# Patient Record
Sex: FEMALE | Race: WHITE | NOT HISPANIC OR LATINO | Employment: PART TIME | URBAN - NONMETROPOLITAN AREA
[De-identification: names, ages, dates, MRNs, and addresses within clinical notes are randomized per-mention and may not be internally consistent; named-entity substitution may affect disease eponyms.]

---

## 2022-08-31 ENCOUNTER — OFFICE VISIT (OUTPATIENT)
Dept: OBSTETRICS AND GYNECOLOGY | Facility: CLINIC | Age: 67
End: 2022-08-31

## 2022-08-31 VITALS — DIASTOLIC BLOOD PRESSURE: 80 MMHG | SYSTOLIC BLOOD PRESSURE: 190 MMHG

## 2022-08-31 DIAGNOSIS — Z90.79 STATUS POST TAH-BSO: Primary | ICD-10-CM

## 2022-08-31 DIAGNOSIS — Z90.710 STATUS POST TAH-BSO: Primary | ICD-10-CM

## 2022-08-31 DIAGNOSIS — Z90.722 STATUS POST TAH-BSO: Primary | ICD-10-CM

## 2022-08-31 DIAGNOSIS — Z98.890 HISTORY OF BURCH URETHROPEXY: ICD-10-CM

## 2022-08-31 DIAGNOSIS — N81.10 FEMALE CYSTOCELE: ICD-10-CM

## 2022-08-31 PROCEDURE — 99203 OFFICE O/P NEW LOW 30 MIN: CPT | Performed by: OBSTETRICS & GYNECOLOGY

## 2022-08-31 RX ORDER — LANOLIN ALCOHOL/MO/W.PET/CERES
1000 CREAM (GRAM) TOPICAL DAILY
COMMUNITY

## 2022-08-31 RX ORDER — NIFEDIPINE 30 MG/1
30 TABLET, EXTENDED RELEASE ORAL DAILY
COMMUNITY

## 2022-08-31 RX ORDER — CHOLECALCIFEROL (VITAMIN D3) 1250 MCG
CAPSULE ORAL
COMMUNITY

## 2022-08-31 RX ORDER — ESZOPICLONE 3 MG/1
3 TABLET, FILM COATED ORAL NIGHTLY
COMMUNITY

## 2022-08-31 RX ORDER — HYDROCHLOROTHIAZIDE 12.5 MG/1
12.5 TABLET ORAL DAILY
COMMUNITY

## 2022-08-31 RX ORDER — LEVOTHYROXINE SODIUM 0.07 MG/1
75 TABLET ORAL DAILY
COMMUNITY

## 2022-08-31 RX ORDER — DIPHENHYDRAMINE HCL 25 MG
25 CAPSULE ORAL EVERY 6 HOURS PRN
COMMUNITY

## 2022-09-01 NOTE — PROGRESS NOTES
I called this patient to inform her of the appointment in [place].  It is scheduled for Tuesday, September 6 at 10:45 with Dr. Llamas.  This is at [address] [place] [address] [address] [place] [address].  Their number is [phone].  I informed the patient of this information.

## 2022-09-20 ENCOUNTER — PATIENT ROUNDING (BHMG ONLY) (OUTPATIENT)
Dept: OBSTETRICS AND GYNECOLOGY | Facility: CLINIC | Age: 67
End: 2022-09-20

## 2022-09-20 NOTE — PROGRESS NOTES
September 20, 2022    Hello, may I speak with Nati Nichols?    My name is Anny Crisostomo    I am  with [facility]  [facility]  [address]  [facility] [address] [phone].    Before we get started may I verify your date of birth? 1955    I am calling to officially welcome you to our practice and ask about your recent visit. Is this a good time to talk? Yes    Tell me about your visit with us. What things went well?  Patient was pleased with visit. She said staff was polite and the nurse made her feel calm due to her being very nervous.Patient stated  that Dr. Stern was very nice during her visit. Plus, she was very happy with wait time.        We're always looking for ways to make our patients' experiences even better. Do you have recommendations on ways we may improve? no    Overall were you satisfied with your first visit to our practice?Yes       I appreciate you taking the time to speak with me today. Is there anything else I can do for you? No      Thank you, and have a great day.

## 2022-09-26 ENCOUNTER — TRANSCRIBE ORDERS (OUTPATIENT)
Dept: PHYSICAL THERAPY | Facility: HOSPITAL | Age: 67
End: 2022-09-26

## 2022-09-26 DIAGNOSIS — R39.15 URINARY URGENCY: ICD-10-CM

## 2022-09-26 DIAGNOSIS — M79.18 MYALGIA OF PELVIC FLOOR: ICD-10-CM

## 2022-09-26 DIAGNOSIS — R35.0 URINARY FREQUENCY: ICD-10-CM

## 2022-09-26 DIAGNOSIS — N39.46 MIXED INCONTINENCE: ICD-10-CM

## 2022-09-26 DIAGNOSIS — R39.15 URGENCY OF URINATION: Primary | ICD-10-CM

## 2022-09-26 DIAGNOSIS — R10.2 PELVIC AND PERINEAL PAIN: ICD-10-CM

## 2022-10-05 ENCOUNTER — HOSPITAL ENCOUNTER (OUTPATIENT)
Dept: PHYSICAL THERAPY | Facility: HOSPITAL | Age: 67
Setting detail: THERAPIES SERIES
Discharge: HOME | End: 2022-10-05

## 2022-10-05 DIAGNOSIS — R10.2 PELVIC PAIN: Primary | ICD-10-CM

## 2022-10-05 PROCEDURE — 97162 PT EVAL MOD COMPLEX 30 MIN: CPT | Performed by: PHYSICAL THERAPIST

## 2022-10-05 NOTE — THERAPY EVALUATION
Outpatient Physical Therapy Pelvic Health Initial Evaluation  [facility]     Patient Name: Nati Nichols  : 1955  MRN: [id number]  Today's Date: 10/5/2022        Visit Date: 10/05/2022   Visit number:   Recheck: 11/3/22  Insurance: Medicare and [employer]    There is no problem list on file for this patient.       Past Medical History:   Diagnosis Date   • Diabetes mellitus (HCC)    • Disease of thyroid gland     hypothyroid   • Hypertension    • Insomnia         Past Surgical History:   Procedure Laterality Date   • APPENDECTOMY     • CARPAL TUNNEL RELEASE Left    • CHOLECYSTECTOMY     • EYE SURGERY      cataracts and lens implant   • HYSTERECTOMY       Allergies   Allergen Reactions   • Losartan Swelling           Visit Dx:    ICD-10-CM ICD-9-CM   1. Pelvic pain  R10.2 [id number]        Patient History     Row Name 10/02/22 1836             History    Chief Complaint Other 2 (comment) (P)    -patient      Patient/Caregiver Goals Relieve pain;Return to prior level of function (P)    -patient      Hand Dominance right-handed (P)    -patient      Occupation/sports/leisure activities Part time [employer] (P)    -patient      What clinical tests have you had for this problem? Urinalysis (P)    -patient      Are you or can you be pregnant No (P)    -patient              Fall Risk Assessment    Any falls in the past year: No (P)    -patient              Services    Prior Rehab/Home Health Experiences No (P)    -patient      Are you currently receiving Home Health services No (P)    -patient      Do you plan to receive Home Health services in the near future No (P)    -patient              Daily Activities    Primary Language English (P)    -patient      Are you able to read Yes (P)    -patient      Are you able to write Yes (P)    -patient      How does patient learn best? Listening;Reading;Demonstration;Pictures/Video (P)    -patient              Safety    Are you being hurt, hit, or  frightened by anyone at home or in your life? No (P)    -patient      Are you being neglected by a caregiver No (P)    -patient            User Key  (r) = Recorded By, (t) = Taken By, (c) = Cosigned By    Initials Name Provider Type    patient Nati Nichols --                 Pelvic Health     Row Name 10/05/22 1100             Subjective Comments    Subjective Comments Pt reports history of feeling the need to empty bladder all the day.  Onset of last 2-3 years. Urgency and some leakage noted.  Just doesn't make it in time.  I had noticed a bulge about 2 years prior.  Last 3 months, I started feeling of heaviness and achiness in lower pelvic girdle.  Also noted bulge comes down further.  Saw Leena and he referred her to Dr. Burton.  Anticipated surgical repair for vaginal lift.  Was pysched up with surgery possibility.  But he didn't skip to surgery quickly.  Patient fearful it will not cure the condition. Grade II.  Everyday struggles.  I feel it more with just sitting down.  Retired but works at [employer]. When on feet, I don't recognize it as much. Bladder voids average 8 per day and at least 2x/night.  Strong urge to urinate always.  Feels emptied following urination and has been waiting a few seconds longer to finish. Defecation about 1 per day or every other day.  [place] average of 4.  No stimulant or laxative required for bowels.  Hydration: coffee 1 cup, tea, water, and hawaiian punch.  Dietary intake is not always good.  Limited fruit and vegetables. No chronic pain issues noted.  -SW              Pregnancy Questions    Number of Pregnancies 2  -SW      Number of Miscarriages 0  -SW      Has the patient had an ? No  -SW      Number of Children 2  -SW      Type of Previous Deliveries Vaginal  -SW              Pain Assessment    Pain Assessment No/denies pain  -SW              Pelvic Floor Muscle    Patient/Parent/Guardian Consented to Internal Pelvic Floor Exam Yes  -SW       Strength (Right) 2: Squeeze no lift  -SW      Strength (Left) 2: Squeeze no lift  -SW      Symmetry of Sustained Maximal Contraction Symmetrical  -SW      Internal Pelvic Floor Comments mild tenderness noted along bilateral obt internus and levator ani muscle bellies.  Llittle to no contraction of PF noted.  Use of tactile stimulation did not create increased muscle activation.  POP with ant wall laxity noted at stage II.  Mild ant translation noted with valsalva.  Post wall with good stability.  -SW      External Pelvic Floor Comments normal inspection with external perineum.  No lesions or abnormalities noted.  No tenderness to external structures.  No visible clitoral nod with activation.  Apical breathing patterns noted with cues for diaphragmatic breathing.  -              Observations    Perineal Observation Performed? Yes  -SW      Posture Observations normal gait inspection.  No AD used.  Independent with all transfers and mobility.  Cognition intact.  -              Observation of Contraction in Perineum    Anal [place] --  not visible  -SW      Perineal Body Lift --  not visible with inspection  -SW              Pelvic Floor    Ability to Isolate Contraction of Pelvic Floor No  -SW      Overflow from Adjacent Muscles Upper Abdominals;Other (comment)  adductors  -SW              Cough    Bulge Yes  -SW              Prolapse (Pop-Q)    Cystocele Stage II  -SW      Rectocele Stage I  -              SEMG Evaluation    SEMG Evaluation Comments deferred this visit  -              Education Provided On:    Education Points HEP;Behavioral modifications  -      Method of Delivery Verbal;Demonstration;Written  -      Education Provided To Patient  -      Level of Understanding Teach back education performed;Verbalized;Demonstrated  -      HEP Comments diaphragmatic breathing with PF elevation over pillows.  -              Outcome Measures    Outcome Measure Options PFDI-20  score of 98.9  -SW               Pelvic Organ Prolapse Distress Inventory 6 (POPDI-6)    Do you usually experience pressure in the lower abdomen? 3  -SW      Do you usually experience heaviness or dullness in the pelvic area 3  -SW      Do you usually have a bulge or something falling out that you can see or feel in your vaginal area? 4  -SW      Do you ever have to push on the vagina or around the rectum to have or complete a bowel movement? 0  -SW      Do you usually experience a feeling of incomplete bladder emptying? 4  -SW      Do you ever have to push up on a bulge in the vaginal area with your fingers to start or complete urination? 0  -SW      POPDI-6 Score 2.33  -SW              Colorectal-Anal Distress Inventory 8 (CRAD-8)    Do you feel you need to strain too hard to have a bowel movement? 1  -SW      Do you feel you have not completely emptied your bowels at the end of a bowel movement? 0  -SW      Do you usually lose stool beyond your control if your stool is well formed? 0  -SW      Do you usually lose stool beyond your control if your stool is loose? 0  -SW      Do you usually lose gas from your rectum beyond your control? 0  -SW      Do you usually have pain when you pass your stool? 0  -SW      Do you experience a strong sense of urgency and have to rush to the bathroom to have a bowel movement 0  -SW      Does part of your bowel ever pass through the rectum and bulge outside during or after a bowel movement? 0  -SW      CRAD-8 Score 0.13  -SW              Urinary Distress Inventory 6 (HUEY-6)    Do you usually experience frequent urination? 4  -SW      Do you usually experience urine leakage associated with a feeling of urgency, that is, a strong sensation of needing to go to the bathroom? 0  -SW      Do you usually experience urine leakage related to coughing, sneezing, or laughing? 0  -SW      Do you usually experience small amounts of urine leakage(that is, drops)? 2  -SW      Do you usually experience difficulty emptying  your bladder? 0  -SW      Do you usually experience pain or discomfort in the lower abdomen or genital region? 3  -SW      HUEY-6 Score 1.5  -SW              General ROM    GENERAL ROM COMMENTS functional ROM to UE/LE  -SW              MMT (Manual Muscle Testing)    General MMT Comments functional LE/UE MMT for functional transfers and mobility.  -SW            User Key  (r) = Recorded By, (t) = Taken By, (c) = Cosigned By    Initials Name Provider Type    Diane Richmond, PT DPT Physical Therapist               PT Ortho     Row Name 10/05/22 1100       Subjective Pain    Able to rate subjective pain? yes  -SW    Pre-Treatment Pain Level 0  -SW    Post-Treatment Pain Level 0  -SW       Posture/Observations    Posture- WNL Posture is WNL  -SW    Posture/Observations Comments patient alert and oriented.  Good historian of medical information. NO apparent distress noted.  Gait is unremarkable.  -SW          User Key  (r) = Recorded By, (t) = Taken By, (c) = Cosigned By    Initials Name Provider Type    Diane Richmond, PT DPT Physical Therapist                            PT Assessment/Plan     Row Name 10/05/22 1700          PT Assessment    Functional Limitations Performance in leisure activities;Limitation in home management;Limitations in community activities;Performance in self-care ADL;Performance in work activities  -     Impairments Impaired muscle endurance;Impaired muscle power;Impaired postural alignment;Muscle strength;Pain;Poor body mechanics;Other (comment)  POP and UI  -SW     Assessment Comments Patient is a 68 yo female presenting with c/o pelvic weakness and secondary POP.  Her condition is complicated with bouts of SAVI and discomfort.  She would benefit from skiled therapy to address dysfunction and improve motor control for stabilization.  -SW     Rehab Potential Good  -SW     Patient/caregiver participated in establishment of treatment plan and goals Yes  -SW     Patient would benefit  from skilled therapy intervention Yes  -            PT Plan    PT Frequency 1x/week  -     Planned CPT's? PT EVAL MOD COMPLELITY: 24853;PT RE-EVAL: 83760;PT THER PROC EA 15 MIN: 42199;PT THER ACT EA 15 MIN: 45465;PT MANUAL THERAPY EA 15 MIN: 45252;PT NEUROMUSC RE-EDUCATION EA 15 MIN: 29406;PT SELF CARE/HOME MGMT/TRAIN EA 15: 05480;PT ELECTRICAL STIM ATTD EA 15 MIN: 58184  -     PT Plan Comments Diaphragmatic breathing with PF coordination.  SEMG as tool for activation or consideration of stimulation for muscle re-ed.  Postural training and pressure managment activities.  Dietary managment for bowels and bladder as to dec urgency and SAVI.  -           User Key  (r) = Recorded By, (t) = Taken By, (c) = Cosigned By    Initials Name Provider Type    Diane Richmond, PT DPT Physical Therapist                   OP Exercises     Row Name 10/05/22 1100             Subjective Comments    Subjective Comments Pt reports history of feeling the need to empty bladder all the day.  Onset of last 2-3 years. Urgency and some leakage noted.  Just doesn't make it in time.  I had noticed a bulge about 2 years prior.  Last 3 months, I started feeling of heaviness and achiness in lower pelvic girdle.  Also noted bulge comes down further.  Saw Leena and he referred her to Dr. Burton.  Anticipated surgical repair for vaginal lift.  Was pysched up with surgery possibility.  But he didn't skip to surgery quickly.  Patient fearful it will not cure the condition. Grade II.  Everyday struggles.  I feel it more with just sitting down.  Retired but works at [employer]. When on feet, I don't recognize it as much. Bladder voids average 8 per day and at least 2x/night.  Strong urge to urinate always.  Feels emptied following urination and has been waiting a few seconds longer to finish. Defecation about 1 per day or every other day.  [place] average of 4.  No stimulant or laxative required for bowels.  Hydration:  coffee 1 cup, tea, water, and hawaiian punch.  Dietary intake is not always good.  Limited fruit and vegetables. No chronic pain issues noted.  -              Subjective Pain    Able to rate subjective pain? yes  -SW      Pre-Treatment Pain Level 0  -SW      Post-Treatment Pain Level 0  -              Exercise 1    Exercise Name 1 diaphragmatic breathing  -SW      Additional Comments hand placement used for training, one over chest and other over diaphrgam.  -              Exercise 2    Exercise Name 2 buttocks elevated position  -      Time 2 10 min  -      Additional Comments position following work activities to assist with gravity position.  -              Exercise 3    Exercise Name 3 intro to dietary managmenet.  -      Additional Comments discussed need for dietary management to dec stool impaction or constipation as this inc pressure to perineum.  -            User Key  (r) = Recorded By, (t) = Taken By, (c) = Cosigned By    Initials Name Provider Type    Diane Richmond, PT DPT Physical Therapist                             PT OP Goals     Row Name 10/05/22 1700          PT Short Term Goals    STG Date to Achieve 11/03/22  -     STG 1 patient to perform diaphragmatic breathing x 3'min with cycles that are performed without cues.  -     STG 1 Progress New  -     STG 2 patient to comply with PF rest position x 10 minutes daily to assist with approximation of POP.  -     STG 2 Progress New  -     STG 3 patient to demonstrate positioning on toilet to assist with elimination and evacuation processess without strain or need to push.  -     STG 3 Progress New  -     STG 4 Patient to show activation of PF with presence of clitoral nod present upon visual examination.  -     STG 4 Progress New  -     STG 5 patient to report dietary management methods to improve intake that promotes good bowel consistency and less urgency to bladder.  -     STG 6 patient to show activation  of PF with SEMG in supine position x 10 reps with full return to baseline.  -     STG 6 Progress New  -     STG 7 Patient to begin bladder retraining methods at intervals of 1-2 hours without inc urgency reported between void schedule.  -     STG 7 Progress New  [facility]            Long Term Goals    LTG Date to Achieve 03/30/23  -     LTG 1 void schedule of 3-4 hours, on average of 6-8 voids per day.  -     LTG 1 Progress New  -     LTG 2 patient to report dec pressure to PF such that she is able to remain in standing position as with grocery shopping 1 hour prior to inc pressure is noted by vaginal cuff.  -     LTG 2 Progress New  -     LTG 3 Nocturia at 0-1 time per night with good control of urge suppression.  -     LTG 3 Progress New  -     LTG 4 patient to show endurance contractions of PF with SEMG position of seated or standing x 10 sec holds that will assist with POP support.  -     LTG 4 Progress New  -     LTG 5 Pt to report PFDI SF 20 score of 30 or less indicating improved function with daily activities with less restrictions.  -     LTG 5 Progress New  -            Time Calculation    PT Goal Re-Cert Due Date 11/03/22  -           User Key  (r) = Recorded By, (t) = Taken By, (c) = Cosigned By    Initials Name Provider Type    Diane Richmond, PT DPT Physical Therapist                Therapy Education  Given: HEP, Symptoms/condition management  Program: New  How Provided: Verbal, Demonstration, Written  Provided to: Patient  Level of Understanding: Teach back education performed, Verbalized, Demonstrated               Time Calculation:   Start Time: 1125  Stop Time: 1245  Time Calculation (min): 80 min  Therapy Charges for Today     Code Description Service Date Service Provider Modifiers Qty    37757991316 HC-PT EVAL MOD COMPLEXITY 5 10/5/2022 Diane Vanessa, PT DPT  1    06165626577 HC PT THER SUPP EA 15 MIN 10/5/2022 Diane Vanessa, PT DPT GP 1                   Diane Vanessa, PT DPT  10/5/2022

## 2022-10-19 ENCOUNTER — HOSPITAL ENCOUNTER (OUTPATIENT)
Dept: PHYSICAL THERAPY | Facility: HOSPITAL | Age: 67
Setting detail: THERAPIES SERIES
Discharge: HOME | End: 2022-10-19

## 2022-10-19 DIAGNOSIS — R10.2 PELVIC PAIN: Primary | ICD-10-CM

## 2022-10-19 PROCEDURE — 97140 MANUAL THERAPY 1/> REGIONS: CPT

## 2022-10-19 PROCEDURE — 97535 SELF CARE MNGMENT TRAINING: CPT

## 2022-10-19 NOTE — THERAPY TREATMENT NOTE
Outpatient Physical Therapy Pelvic Health Treatment Note  [facility]     Patient Name: Nati Nichols  : 1955  MRN: [id number]  Today's Date: 10/19/2022    Visit number:   Recheck: 11/3/22  Insurance: Medicare and [employer] [employer]    Visit Date: 10/19/2022       Visit Dx:    ICD-10-CM ICD-9-CM   1. Pelvic pain  R10.2 [id number]       There is no problem list on file for this patient.        Pelvic Health     Row Name 10/19/22 09             Subjective Comments    Subjective Comments Pt reports everything is about the same. States she has  been doing the breathing exercises. States she does them at night. Reports she doubles one pillow up instead of two. Pt reports urges are about the same. Trying hard to go every two hours.  -AC         Pregnancy Questions    Number of Pregnancies 2  -AC      Number of Miscarriages 0  -AC      Has the patient had an ? No  -AC      Number of Children 2  -AC      Type of Previous Deliveries Vaginal  -AC         Pain Assessment    Pain Assessment No/denies pain  -AC            User Key  (r) = Recorded By, (t) = Taken By, (c) = Cosigned By    Initials Name Provider Type     Ivett Barrios, PT Physical Therapist               PT Ortho     Row Name 10/19/22 09       Subjective Pain    Able to rate subjective pain? yes  -AC    Pre-Treatment Pain Level 0  -AC    Post-Treatment Pain Level 0  -AC          User Key  (r) = Recorded By, (t) = Taken By, (c) = Cosigned By    Initials Name Provider Type     Ivett Barrios PT Physical Therapist                            PT Assessment/Plan     Row Name 10/19/22 09          PT Assessment    Assessment Comments PT and patient spent majority of treatment session reinforcing diaphragmatic breathing, relaxation techniques, and abdominal massage education. Pt tolerated abdominal massage well with no increase in symptoms. Pt demo slight adhesions on L abdominal wall near descending colon. PT ed patient to spend more time on  this area during abdominal massage. PT provided patient with a bladder diary at end of session to complete over the next 2 weeks. Will review with patient at next appointment.  -AC     Rehab Potential Good  -AC     Patient/caregiver participated in establishment of treatment plan and goals Yes  -AC     Patient would benefit from skilled therapy intervention Yes  -AC        PT Plan    PT Frequency 1x/week  -AC     PT Plan Comments Next visit review bladder diary and provide education on  bladder health, bladder irritants, and bladder urgency suppression. Initiate bladder retraining. Assess PF contractions next visit.  -AC           User Key  (r) = Recorded By, (t) = Taken By, (c) = Cosigned By    Initials Name Provider Type    AC Ivett Barrios, PT Physical Therapist                   OP Exercises     Row Name 10/19/22 0900             Subjective Comments    Subjective Comments Pt reports everything is about the same. States she has  been doing the breathing exercises. States she does them at night. Reports she doubles one pillow up instead of two. Pt reports urges are about the same. Trying hard to go every two hours.  -AC         Subjective Pain    Able to rate subjective pain? yes  -AC      Pre-Treatment Pain Level 0  -AC      Post-Treatment Pain Level 0  -AC         Exercise 1    Exercise Name 1 Patient education on relaxation techniques, diaphragmatic breathing  -AC      Time 1 20 mins  -AC         Exercise 2    Exercise Name 2 Diaphragmatic breathing with pelvic floor coordination  -AC      Time 2 10 min  -AC      Additional Comments buttocks elevated position  -AC         Exercise 3    Exercise Name 3 Diaphragmatic breathing with PF contraction  -AC      Time 3 5 mins  -AC         Exercise 4    Exercise Name 4 Abdominal massage + patient education  -AC      Time 4 8 mins  -AC         Exercise 5    Exercise Name 5 Bladder diary education for HEP  -AC      Time 5 12 mins  -AC            User Key  (r) =  Recorded By, (t) = Taken By, (c) = Cosigned By    Initials Name Provider Type    Ivett Barney, PT Physical Therapist                             PT OP Goals     Row Name 10/19/22 0900          PT Short Term Goals    STG Date to Achieve 11/03/22  -     STG 1 patient to perform diaphragmatic breathing x 3'min with cycles that are performed without cues.  -AC     STG 1 Progress New  -     STG 2 patient to comply with PF rest position x 10 minutes daily to assist with approximation of POP.  -AC     STG 2 Progress New  -     STG 3 patient to demonstrate positioning on toilet to assist with elimination and evacuation processess without strain or need to push.  -AC     STG 3 Progress New  -     STG 4 Patient to show activation of PF with presence of clitoral nod present upon visual examination.  -AC     STG 4 Progress New  -     STG 5 patient to report dietary management methods to improve intake that promotes good bowel consistency and less urgency to bladder.  -     STG 6 patient to show activation of PF with SEMG in supine position x 10 reps with full return to baseline.  -AC     STG 6 Progress New  -     STG 7 Patient to begin bladder retraining methods at intervals of 1-2 hours without inc urgency reported between void schedule.  -     STG 7 Progress New  -        Long Term Goals    LTG Date to Achieve 03/30/23  -     LTG 1 void schedule of 3-4 hours, on average of 6-8 voids per day.  -AC     LTG 1 Progress New  -     LTG 2 patient to report dec pressure to PF such that she is able to remain in standing position as with grocery shopping 1 hour prior to inc pressure is noted by vaginal cuff.  -AC     LTG 2 Progress New  -     LTG 3 Nocturia at 0-1 time per night with good control of urge suppression.  -AC     LTG 3 Progress New  -     LTG 4 patient to show endurance contractions of PF with SEMG position of seated or standing x 10 sec holds that will assist with POP support.  -     LTG  4 Progress New  -AC     LTG 5 Pt to report PFDI SF 20 score of 30 or less indicating improved function with daily activities with less restrictions.  -AC     LTG 5 Progress New  -AC        Time Calculation    PT Goal Re-Cert Due Date 11/03/22  -AC           User Key  (r) = Recorded By, (t) = Taken By, (c) = Cosigned By    Initials Name Provider Type    AC BarriosIvett, PT Physical Therapist                                Time Calculation:   Start Time: 0913  Stop Time: 1014  Time Calculation (min): 61 min  Therapy Charges for Today     Code Description Service Date Service Provider Modifiers Qty    99074711427 HC PT THER SUPP EA 15 MIN 10/19/2022 [place], PT GP 2    40333962998 HC PT MANUAL THERAPY EA 15 MIN 10/19/2022 [place] Ivett, PT GP 1    [id number] HC PT SELF CARE/MGMT/TRAIN EA 15 MIN 10/19/2022 [place], PT GP 3                    Ivett [place], KAMRYN , DPT 10/19/2022

## 2022-11-02 ENCOUNTER — HOSPITAL ENCOUNTER (OUTPATIENT)
Dept: PHYSICAL THERAPY | Facility: HOSPITAL | Age: 67
Setting detail: THERAPIES SERIES
Discharge: HOME | End: 2022-11-02

## 2022-11-02 DIAGNOSIS — R10.2 PELVIC PAIN: Primary | ICD-10-CM

## 2022-11-02 PROCEDURE — 97535 SELF CARE MNGMENT TRAINING: CPT

## 2022-11-02 NOTE — THERAPY TREATMENT NOTE
Outpatient Physical Therapy Pelvic Health Treatment Note  [facility]     Patient Name: Nati Nichols  : 1955  MRN: [id number]  Today's Date: 2022        Visit Date: 2022   Visit number: 3/3  Recheck: 11/3/22  Insurance: Medicare and [employer]    Visit Dx:    ICD-10-CM ICD-9-CM   1. Pelvic pain  R10.2 [id number]       There is no problem list on file for this patient.        Pelvic Health     Row Name 22 0900             Subjective Comments    Subjective Comments Pt reports she has had a cough for the past couple of days. Pt reports she has been good. Pt reports she stopped drinking hawaiian punch. Reports no leakage recently. Reports she voids about 6x/day.  -AC         Pregnancy Questions    Number of Pregnancies 2  -AC      Number of Miscarriages 0  -AC      Has the patient had an ? No  -AC      Number of Children 2  -AC      Type of Previous Deliveries Vaginal  -AC         Pain Assessment    Pain Assessment No/denies pain  -AC            User Key  (r) = Recorded By, (t) = Taken By, (c) = Cosigned By    Initials Name Provider Type    AC Ivett Barrios, PT Physical Therapist                              PT Assessment/Plan     Row Name 22 1000          PT Assessment    Assessment Comments PT and patient spent entire session review bladder diary. PT also provided patient with bladder retraining skills and review of bladder health, bladder irritants, and fiber intake. Pt bladder diary revealed increased voids every 1x/hour to 2x/hour each day. Pt also exhibits ~20-30 oz of fluid per day with 2/3 bladder irritants and 1/3 nonirritants. PT ed pt to increased fluid oz to 40 oz per day with 2/3 or 26 oz nonirritants or water and 1/3 bladder irritants. Pt verbalized understand. Bladder diary also revealed poor diet habits with little to no fiber content and no fruits or vegatables. PT ed pt on fiber intake and eating 3 square meals per day. Stools stayed at type 6 on bristol  stool scale indicating looser stools and not enough fiber intake. For bladder retraining pt will attempt to void every 2-2.5 hours per day at regular scheduled intervals. Will reassess progress in 2 weeks.  -     Rehab Potential Good  -     Patient/caregiver participated in establishment of treatment plan and goals Yes  -AC     Patient would benefit from skilled therapy intervention Yes  -AC        PT Plan    PT Frequency Other (comment)  every 2 weeks  -     PT Plan Comments Next visit reassess bladder retraining. Add biofeedback and pelvic floor contraction review.  -           User Key  (r) = Recorded By, (t) = Taken By, (c) = Cosigned By    Initials Name Provider Type    Ivett Barney, PT Physical Therapist                   OP Exercises     Row Name 11/02/22 0900             Subjective Comments    Subjective Comments Pt reports she has had a cough for the past couple of days. Pt reports she has been good. Pt reports she stopped drinking hawaiian punch. Reports no leakage recently. Reports she voids about 6x/day.  -         Exercise 1    Exercise Name 1 Bladder diary review  -         Exercise 2    Exercise Name 2 Review of HEP and diaphragmatic breathing  -         Exercise 3    Exercise Name 3 Bladder health, bladder irritants, fiber intake education  -         Exercise 4    Exercise Name 4 Bladder Retraining  -            User Key  (r) = Recorded By, (t) = Taken By, (c) = Cosigned By    Initials Name Provider Type    Ivett Barney, PT Physical Therapist                             PT OP Goals     Row Name 11/02/22 1000 11/02/22 0900       PT Short Term Goals    STG Date to Achieve 11/03/22  -AC --    STG 1 patient to perform diaphragmatic breathing x 3'min with cycles that are performed without cues.  -AC --    STG 1 Progress Met  -AC --    STG 2 patient to comply with PF rest position x 10 minutes daily to assist with approximation of POP.  -AC --    STG 2 Progress Met  -AC --     STG 3 patient to demonstrate positioning on toilet to assist with elimination and evacuation processess without strain or need to push.  -AC --    STG 3 Progress Progressing  -AC --    STG 4 Patient to show activation of PF with presence of clitoral nod present upon visual examination.  -AC --    STG 4 Progress New  -AC --    STG 5 patient to report dietary management methods to improve intake that promotes good bowel consistency and less urgency to bladder.  -AC --    STG 5 Progress Progressing  -AC --    STG 6 patient to show activation of PF with SEMG in supine position x 10 reps with full return to baseline.  -AC --    STG 6 Progress New  -AC --    STG 7 Patient to begin bladder retraining methods at intervals of 1-2 hours without inc urgency reported between void schedule.  -AC --    STG 7 Progress Met;Ongoing  -AC --       Long Term Goals    LTG Date to Achieve 03/30/23  -AC --    LTG 1 void schedule of 3-4 hours, on average of 6-8 voids per day.  -AC --    LTG 1 Progress New  -AC --    LTG 2 patient to report dec pressure to PF such that she is able to remain in standing position as with grocery shopping 1 hour prior to inc pressure is noted by vaginal cuff.  -AC --    LTG 2 Progress New  -AC --    LTG 3 Nocturia at 0-1 time per night with good control of urge suppression.  -AC --    LTG 3 Progress New  -AC --    LTG 4 patient to show endurance contractions of PF with SEMG position of seated or standing x 10 sec holds that will assist with POP support.  -AC --    LTG 4 Progress New  -AC --    LTG 5 Pt to report PFDI SF 20 score of 30 or less indicating improved function with daily activities with less restrictions.  -AC --    LTG 5 Progress New  -AC --       Time Calculation    PT Goal Re-Cert Due Date -- 11/03/22  -AC          User Key  (r) = Recorded By, (t) = Taken By, (c) = Cosigned By    Initials Name Provider Type    Ivett Barney, PT Physical Therapist                                Time  Calculation:   Start Time: 0915  Stop Time: 1016  Time Calculation (min): 61 min  Therapy Charges for Today     Code Description Service Date Service Provider Modifiers Qty    07530002235 HC PT THER SUPP EA 15 MIN 11/2/2022 Ivett Barrios, PT GP 1    [id number]  PT SELF CARE/MGMT/TRAIN EA 15 MIN 11/2/2022 Ivett Barrios, PT GP 4                    Autumn KAMRYN Barrios , DPT 11/2/2022

## 2022-11-16 ENCOUNTER — HOSPITAL ENCOUNTER (OUTPATIENT)
Dept: PHYSICAL THERAPY | Facility: HOSPITAL | Age: 67
Setting detail: THERAPIES SERIES
Discharge: HOME | End: 2022-11-16

## 2022-11-16 DIAGNOSIS — R10.2 PELVIC PAIN: Primary | ICD-10-CM

## 2022-11-16 PROCEDURE — 97535 SELF CARE MNGMENT TRAINING: CPT

## 2022-11-16 PROCEDURE — 97112 NEUROMUSCULAR REEDUCATION: CPT

## 2022-11-16 PROCEDURE — 97140 MANUAL THERAPY 1/> REGIONS: CPT

## 2022-11-16 NOTE — THERAPY TREATMENT NOTE
Outpatient Physical Therapy Pelvic Health Progress Note  [facility]     Patient Name: Nati Nichols  : 1955  MRN: [id number]  Today's Date: 2022    Visit number:   Recheck: 2022   Insurance: Medicare and [place] [employer]    Visit Date: 2022    Visit Dx:    ICD-10-CM ICD-9-CM   1. Pelvic pain  R10.2 [id number]       There is no problem list on file for this patient.        Pelvic Health     Row Name 22 0900             Pregnancy Questions    Number of Pregnancies 2  -AC      Number of Miscarriages 0  -AC      Has the patient had an ? No  -AC      Number of Children 2  -AC      Type of Previous Deliveries Vaginal  -AC         Pain Assessment    Pain Assessment No/denies pain  -AC            User Key  (r) = Recorded By, (t) = Taken By, (c) = Cosigned By    Initials Name Provider Type    AC Ivett Barrios, PT Physical Therapist               PT Ortho     Row Name 22 09       Subjective Comments    Subjective Comments Pt reports she still has a cough. States things have been going good. Pt reports she is been trying to incorporate more water and has added gingerale. Patient also states she completely stopped drinking [place] punch. Pt reports frequency is about the same. Pt reports she is going every 2.5 hours. Pt reports sometimes it can be 1.5 hours. Pt reports bristol stool scale is averaging type 4 or 5. Pt reports she has been eating some apple sauce and yogurt for bulking stool. Pt reports she is not feeling pressure nearly as much. Pt reports she only feels pressure when she really has to go to the BR. Reports pain in L abdominal region and L anterior groin region due to a recent LOB at work where she almost fell but caught herself.  -AC       Subjective Pain    Able to rate subjective pain? yes  -AC    Pre-Treatment Pain Level 0  -AC    Post-Treatment Pain Level 0  -AC          User Key  (r) = Recorded By, (t) = Taken By, (c) = Cosigned By    Initials Name  Provider Type    AC Ivett Barrios, PT Physical Therapist                            PT Assessment/Plan     Row Name 11/16/22 0900          PT Assessment    Functional Limitations Performance in leisure activities;Limitation in home management;Limitations in community activities;Performance in self-care ADL;Performance in work activities  -     Impairments Impaired muscle endurance;Impaired muscle power;Impaired postural alignment;Muscle strength;Pain;Poor body mechanics;Other (comment)  POP and UI  -AC     Assessment Comments Patient good recall of bladder health and toileting positions from previous visit. Pt reports she is maintaining 2.5 hour intervals for voids most of the time. States she has increased frequency for voids at home due to not being able to distract herself well. Patient presents with some tissue restrictions to L abdominal lower quadrant region and L hip iliopsoas this date. STM/MFR performed in those regions with symptom reduction reported at end of session. PT and patient spent increased time this date going over PF contractions and coordination with diaphragmatic breathing. Patient asked to demo with incorrect form and technique with patient attempting to contract on inhale rather than exhale. Pt responded well with zipper cue for proper PF contraction. PT provided PF contractions in buttocks elevated position to HEP. Will perform SEMG next visit now that patient has better understanding of technique. Patient at this time is making good progress with 5/13 goals met. Pt would still continue from skilled PT to address remaining dysfunction, improve motor control, and reduce pressure present from POP.  -AC     Rehab Potential Good  -AC     Patient/caregiver participated in establishment of treatment plan and goals Yes  -AC     Patient would benefit from skilled therapy intervention Yes  -AC        PT Plan    PT Frequency 1x/week  -AC     PT Plan Comments Next visit perform SEMG for pelvic  "floor retraining. Continue MT to L iliopsoas as needed.  -AC           User Key  (r) = Recorded By, (t) = Taken By, (c) = Cosigned By    Initials Name Provider Type    AC Ivett Barrios, PT Physical Therapist                   OP Exercises     Row Name 11/16/22 0900             Subjective Comments    Subjective Comments Pt reports she still has a cough. States things have been going good. Pt reports she is been trying to incorporate more water and has added gingerale. Patient also states she completely stopped drinking [place]. Pt reports frequency is about the same. Pt reports she is going every 2.5 hours. Pt reports sometimes it can be 1.5 hours. Pt reports bristol stool scale is averaging type 4 or 5. Pt reports she has been eating some apple sauce and yogurt for bulking stool. Pt reports she is not feeling pressure nearly as much. Pt reports she only feels pressure when she really has to go to the BR. Reports pain in L abdominal region and L anterior groin region due to a recent LOB at work where she almost fell but caught herself.  -AC         Subjective Pain    Able to rate subjective pain? yes  -AC      Pre-Treatment Pain Level 0  -AC      Post-Treatment Pain Level 0  -AC         Exercise 1    Exercise Name 1 Review of bladder retraining and urge suppression techniques  -AC      Time 1 5 mins  -AC         Exercise 2    Exercise Name 2 Review of diaphragmatic breathing  -AC      Time 2 5 mins  -AC         Exercise 3    Exercise Name 3 TA contraction in buttocks elevated position  -AC      Cueing 3 Verbal;Tactile;Demo  -AC      Time 3 3\" hold for 5 mins  -AC         Exercise 4    Exercise Name 4 PF contractions in buttocks elevated position  -AC      Cueing 4 Verbal;Tactile  -AC      Time 4 8 mins  -AC      Additional Comments coordination with diaphragmatic breathing  -AC         Exercise 5    Exercise Name 5 L hip flexor st. stretch  -AC      Sets 5 1  -AC      Time 5 30 sec hold  -AC         " Exercise 6    Exercise Name 6 manual therapy  -AC      Additional Comments see flowchart  -            User Key  (r) = Recorded By, (t) = Taken By, (c) = Cosigned By    Initials Name Provider Type    Ivett Barney, PT Physical Therapist              Manual Rx (last 36 hours)     Manual Treatments     Row Name 11/16/22 0900             Manual Rx 1    Manual Rx 1 Location STM/MFR with reinforcement L iliopsoas  -AC      Manual Rx 1 Duration 10 mins  -AC         Manual Rx 2    Manual Rx 2 Location STM to L abdomen lower quadrant  -AC      Manual Rx 2 Duration 8 mins  -            User Key  (r) = Recorded By, (t) = Taken By, (c) = Cosigned By    Initials Name Provider Type    AC Ivett Barrios, PT Physical Therapist                           PT OP Goals     Row Name 11/16/22 0900          PT Short Term Goals    STG Date to Achieve 12/07/22  -     STG 1 patient to perform diaphragmatic breathing x 3'min with cycles that are performed without cues.  -AC     STG 1 Progress Met  -AC     STG 2 patient to comply with PF rest position x 10 minutes daily to assist with approximation of POP.  -AC     STG 2 Progress Met  -AC     STG 3 patient to demonstrate positioning on toilet to assist with elimination and evacuation processess without strain or need to push.  -AC     STG 3 Progress Met  -     STG 4 Patient to show activation of PF with presence of clitoral nod present upon visual examination.  -AC     STG 4 Progress Ongoing;Progressing  -AC     STG 5 patient to report dietary management methods to improve intake that promotes good bowel consistency and less urgency to bladder.  -     STG 5 Progress Met  -     STG 6 patient to show activation of PF with SEMG in supine position x 10 reps with full return to baseline.  -     STG 6 Progress New  -     STG 7 Patient to begin bladder retraining methods at intervals of 1-2 hours without inc urgency reported between void schedule.  -AC     STG 7 Progress  Met;Ongoing  -AC        Long Term Goals    LTG Date to Achieve 03/30/23  -AC     LTG 1 void schedule of 3-4 hours, on average of 6-8 voids per day.  -AC     LTG 1 Progress New  -AC     LTG 2 patient to report dec pressure to PF such that she is able to remain in standing position as with grocery shopping 1 hour prior to inc pressure is noted by vaginal cuff.  -AC     LTG 2 Progress New  -AC     LTG 3 Nocturia at 0-1 time per night with good control of urge suppression.  -AC     LTG 3 Progress New  -AC     LTG 4 patient to show endurance contractions of PF with SEMG position of seated or standing x 10 sec holds that will assist with POP support.  -AC     LTG 4 Progress New  -AC     LTG 5 Pt to report PFDI SF 20 score of 30 or less indicating improved function with daily activities with less restrictions.  -     LTG 5 Progress New  -        Time Calculation    PT Goal Re-Cert Due Date 12/07/22  -           User Key  (r) = Recorded By, (t) = Taken By, (c) = Cosigned By    Initials Name Provider Type    Ivett Barney, PT Physical Therapist                                Time Calculation:   Start Time: 0905  Stop Time: 1010  Time Calculation (min): 65 min  Therapy Charges for Today     Code Description Service Date Service Provider Modifiers Qty    08535917673 HC PT THER SUPP EA 15 MIN 11/16/2022 BarriosIvett, PT GP 1    [id number] HC PT MANUAL THERAPY EA 15 MIN 11/16/2022 BarriosIvett, PT GP 2    [id number] HC PT NEUROMUSC RE EDUCATION EA 15 MIN 11/16/2022 BarriosIvett, PT GP 1    [id number] HC PT SELF CARE/MGMT/TRAIN EA 15 MIN 11/16/2022 BarriosIvett, PT GP 1                    Ivett Barrios PT , DPT 11/16/2022

## 2022-11-23 ENCOUNTER — HOSPITAL ENCOUNTER (OUTPATIENT)
Dept: PHYSICAL THERAPY | Facility: HOSPITAL | Age: 67
Setting detail: THERAPIES SERIES
Discharge: HOME | End: 2022-11-23

## 2022-11-23 DIAGNOSIS — R10.2 PELVIC PAIN: Primary | ICD-10-CM

## 2022-11-23 PROCEDURE — 97112 NEUROMUSCULAR REEDUCATION: CPT

## 2022-11-23 PROCEDURE — 97140 MANUAL THERAPY 1/> REGIONS: CPT

## 2022-11-23 NOTE — THERAPY TREATMENT NOTE
Outpatient Physical Therapy Pelvic Health Treatment Note  [facility]     Patient Name: Nati Nichols  : 1955  MRN: [id number]  Today's Date: 2022    Visit number:   Recheck: 2022   Insurance: Medicare and [place] [employer]    Visit Date: 2022    Visit Dx:    ICD-10-CM ICD-9-CM   1. Pelvic pain  R10.2 [id number]       There is no problem list on file for this patient.        Pelvic Health     Row Name 22 08             Subjective Comments    Subjective Comments Patient reports things are about the same. Reports she started buying whole grains and eating broccoli and grapes. Started eating more pickles. Pt reports she has been going roughly 2-2.5 hour voids. Pt reports she has been doing her urge suppression tecniques. Reports she is unsure if it helps or takes her mind off of it. Pt denies leakage. Pt reports last night she had 3 urgency for void and had to run to the bathroom. States she thinks the exercises seemed to be helping. Reports she had one night where she didnt go to the bathroom at night.  -AC         Pregnancy Questions    Number of Pregnancies 2  -AC      Number of Miscarriages 0  -AC      Has the patient had an ? No  -AC      Number of Children 2  -AC      Type of Previous Deliveries Vaginal  -AC         Pain Assessment    Pain Assessment No/denies pain  -AC            User Key  (r) = Recorded By, (t) = Taken By, (c) = Cosigned By    Initials Name Provider Type    AC Ivett Barrios, PT Physical Therapist               PT Ortho     Row Name 22 08       Subjective Pain    Able to rate subjective pain? yes  -AC          User Key  (r) = Recorded By, (t) = Taken By, (c) = Cosigned By    Initials Name Provider Type    AC Ivett Barrios, PT Physical Therapist                            PT Assessment/Plan     Row Name 22 08          PT Assessment    Assessment Comments Patient repots some improvement since initial eval. Patient reports reduced  urgency and pressure to PF. Patient demo good carryover from last session with proper TA activation with coordination of diaphragmatic breathing. Incorporated new core stability + PF muscle activation exercises this date. Patient required increased cueing for BKFO due to losing TA contraction during eccentric lowering phase of B LEs. Will need continued reinforcement. Taut bands and tenderness still present to L iliopsoas. Patient presented with tenderness to L rectus abdominis this date with reduction of symptoms after MFR to tissue fibers. SEMG will be performed next visit to begin muscle re-ed of PF muscles. Increased voiding schedule to 2.5 hour increments this date. Patient verbalized understanding. Added hip adduction with core +PF contraction to HEP.  -AC     Rehab Potential Good  -AC     Patient/caregiver participated in establishment of treatment plan and goals Yes  -AC     Patient would benefit from skilled therapy intervention Yes  -AC        PT Plan    PT Frequency 1x/week  -AC     PT Plan Comments SEMG next visit. Continue core stabilization and pelvic floor activation exercises. MT to L rectus abdominis fibers and L iliopsoas as tolerated. Reassess bladder retraining.  -AC           User Key  (r) = Recorded By, (t) = Taken By, (c) = Cosigned By    Initials Name Provider Type     Ivett Barrios, PT Physical Therapist                   OP Exercises     Row Name 11/23/22 0800             Subjective Comments    Subjective Comments Patient reports things are about the same. Reports she started buying whole grains and eating broccoli and grapes. Started eating more pickles. Pt reports she has been going roughly 2-2.5 hour voids. Pt reports she has been doing her urge suppression tecniques. Reports she is unsure if it helps or takes her mind off of it. Pt denies leakage. Pt reports last night she had 3 urgency for void and had to run to the bathroom. States she thinks the exercises seemed to be helping.  "Reports she had one night where she didnt go to the bathroom at night.  -AC         Subjective Pain    Able to rate subjective pain? yes  -AC      Pre-Treatment Pain Level 0  -AC      Post-Treatment Pain Level 0  -AC         Exercise 1    Exercise Name 1 review of bladder retraining  -AC      Time 1 5 mins  -AC      Additional Comments Still maintaining 2-2.5 hour increments.  -AC         Exercise 2    Exercise Name 2 Manual therapy  -AC      Additional Comments see flow chart  -AC         Exercise 3    Exercise Name 3 TA contraction in buttocks elevated position  -AC      Cueing 3 Verbal;Tactile;Demo  -AC      Time 3 3\" hold for 5 mins  -AC         Exercise 4    Exercise Name 4 PF contractions in buttocks elevated position  -AC      Cueing 4 Verbal;Tactile  -AC      Time 4 3 mins  -AC         Exercise 5    Exercise Name 5 hip adduction with core + PF in elevated buttock position  -AC      Cueing 5 Verbal;Tactile;Demo  -AC      Sets 5 1  -AC      Reps 5 20  -AC      Time 5 3\" hold  -AC      Additional Comments pillow in between legs  -AC         Exercise 6    Exercise Name 6 BKFO with core + PF in elevated buttock position  -AC      Cueing 6 Verbal;Tactile;Demo  -AC      Sets 6 1  -AC      Reps 6 10 each  -AC      Additional Comments Patient required cues to maintain TA contraction during eccentric lowering phase of activity.  -AC         Exercise 7    Exercise Name 7 L hip flexor st. stretch  -AC      Sets 7 2  -AC      Time 7 30 sec hold  -AC         Exercise 8    Exercise Name 8 SEMG  -AC      Additional Comments deferred this date. Will perform next visit  -AC         Exercise 9    Exercise Name 9 Patient ed on POC and updated HEP  -AC            User Key  (r) = Recorded By, (t) = Taken By, (c) = Cosigned By    Initials Name Provider Type    Ivett Barney PT Physical Therapist              Manual Rx (last 36 hours)     Manual Treatments     Row Name 11/23/22 0900             Manual Rx 1    Manual Rx 1 " Location STM/MFR with deactivation L rectus abdominis  -AC         Manual Rx 2    Manual Rx 2 Location STM/MFR with deactivation L iliopsoas  -AC            User Key  (r) = Recorded By, (t) = Taken By, (c) = Cosigned By    Initials Name Provider Type    Ivett Barney, PT Physical Therapist                           PT OP Goals     Row Name 11/23/22 0800          PT Short Term Goals    STG Date to Achieve 12/07/22  -AC     STG 1 patient to perform diaphragmatic breathing x 3'min with cycles that are performed without cues.  -AC     STG 1 Progress Met  -AC     STG 2 patient to comply with PF rest position x 10 minutes daily to assist with approximation of POP.  -AC     STG 2 Progress Met  -AC     STG 3 patient to demonstrate positioning on toilet to assist with elimination and evacuation processess without strain or need to push.  -AC     STG 3 Progress Met  -AC     STG 4 Patient to show activation of PF with presence of clitoral nod present upon visual examination.  -AC     STG 4 Progress Ongoing;Progressing  -AC     STG 5 patient to report dietary management methods to improve intake that promotes good bowel consistency and less urgency to bladder.  -AC     STG 5 Progress Met  -AC     STG 6 patient to show activation of PF with SEMG in supine position x 10 reps with full return to baseline.  -AC     STG 6 Progress New  -AC     STG 7 Patient to begin bladder retraining methods at intervals of 1-2 hours without inc urgency reported between void schedule.  -AC     STG 7 Progress Met;Ongoing  -AC        Long Term Goals    LTG Date to Achieve 03/30/23  -AC     LTG 1 void schedule of 3-4 hours, on average of 6-8 voids per day.  -AC     LTG 1 Progress New  -AC     LTG 2 patient to report dec pressure to PF such that she is able to remain in standing position as with grocery shopping 1 hour prior to inc pressure is noted by vaginal cuff.  -AC     LTG 2 Progress Ongoing;Progressing  -AC     LTG 3 Nocturia at 0-1 time  per night with good control of urge suppression.  -AC     LTG 3 Progress Ongoing;Progressing  -AC     LTG 4 patient to show endurance contractions of PF with SEMG position of seated or standing x 10 sec holds that will assist with POP support.  -AC     LTG 4 Progress New  -AC     LTG 5 Pt to report PFDI SF 20 score of 30 or less indicating improved function with daily activities with less restrictions.  -AC     LTG 5 Progress New  -        Time Calculation    PT Goal Re-Cert Due Date 12/07/22  -           User Key  (r) = Recorded By, (t) = Taken By, (c) = Cosigned By    Initials Name Provider Type     Ivett Barrios, PT Physical Therapist                                Time Calculation:   Start Time: 0813  Stop Time: 0915  Time Calculation (min): 62 min  Therapy Charges for Today     Code Description Service Date Service Provider Modifiers Qty    92214002460 HC PT THER SUPP EA 15 MIN 11/23/2022 [place] Ivett, PT GP 1    37266019850 HC PT MANUAL THERAPY EA 15 MIN 11/23/2022 [place] Ivett, PT GP 2    [id number]  PT NEUROMUSC RE EDUCATION EA 15 MIN 11/23/2022 [place] Ivett, PT GP 2                    Ivett [place], PT , DPT 11/23/2022

## 2022-12-07 ENCOUNTER — HOSPITAL ENCOUNTER (OUTPATIENT)
Dept: PHYSICAL THERAPY | Facility: HOSPITAL | Age: 67
Setting detail: THERAPIES SERIES
Discharge: HOME | End: 2022-12-07

## 2022-12-07 DIAGNOSIS — R10.2 PELVIC PAIN: Primary | ICD-10-CM

## 2022-12-07 PROCEDURE — 97140 MANUAL THERAPY 1/> REGIONS: CPT

## 2022-12-07 PROCEDURE — 97535 SELF CARE MNGMENT TRAINING: CPT

## 2022-12-07 PROCEDURE — 97112 NEUROMUSCULAR REEDUCATION: CPT

## 2022-12-07 NOTE — THERAPY TREATMENT NOTE
Outpatient Physical Therapy Pelvic Health Treatment Note  [facility]     Patient Name: Nati Nichols  : 1955  MRN: [id number]  Today's Date: 2022    Visit number:    Recheck: 2022   Insurance: Medicare and [place] [employer]    Visit Date: 2022    Visit Dx:    ICD-10-CM ICD-9-CM   1. Pelvic pain  R10.2 [id number]       There is no problem list on file for this patient.        Pelvic Health     Row Name 22 1000             Subjective Comments    Subjective Comments Patient reports once a week she does not get up during the night. Reports she tries not to drink tea before she goes to bed. Reports she went one day over 4.5 hours before she went to the BR. Patient rpeorts inconsistency with voids. Patient reports the urge suppression technqiues have been helping. Denies leakage. States it is harder to be consistent with voids at work. Patient reports continued muscle pain in L hip. Does not feel any pelvic pressure now.  -AC         Pregnancy Questions    Number of Pregnancies 2  -AC      Number of Miscarriages 0  -AC      Has the patient had an ? No  -AC      Number of Children 2  -AC      Type of Previous Deliveries Vaginal  -AC         Pain Assessment    Pain Assessment No/denies pain  -AC            User Key  (r) = Recorded By, (t) = Taken By, (c) = Cosigned By    Initials Name Provider Type    Ivett Barney, PT Physical Therapist                              PT Assessment/Plan     Row Name 22 1200          PT Assessment    Assessment Comments Patient presents with continued tautness and tenderness to L iliopsoas this visit. STM/MFR + deactivation of L iliopsoas performed with reduction of symptoms present. Will reassess again next visit. SEMG performed this visit to help promote muscle re-ed, endurance, and strength of patient's PF muscles. SEMG performed internal in vaginal canal with rectal sensor. Resting tone of patient PF 3.3 mV. Patient only able to maintain  "contractions for 2 seconds before drop in mV. 2 seconds holds were than performed for repetitions for 5 mins. Patient will need continued practice to help improve endurance of PF muscles. Will work on quick flicks of PF in future visits as well. Patient required cueing for BKFO with TA contractions this visit due to patient reports \"I do not feel like it is working\". Patient appeared to not be maintaining TA contraction upon eccentric lowering of LEs. Patient able to fix incorrect technique after cueing. Patient also has reports inconsistencies with bladder retraining with reports of holding her bladder for 4.5 hours at times. PT educated patient to see progress patient will have to stay consistent with voids every 2-2.5 hour intervals. Pt verbalized understanding.  -AC     Please refer to paper survey for additional self-reported information No  -AC     Rehab Potential Good  -AC     Patient/caregiver participated in establishment of treatment plan and goals Yes  -AC     Patient would benefit from skilled therapy intervention Yes  -AC        PT Plan    PT Frequency 1x/week  -AC     PT Plan Comments Continue SEMG next visit. Add HLM with TA contractions.  -AC           User Key  (r) = Recorded By, (t) = Taken By, (c) = Cosigned By    Initials Name Provider Type    Ivett Barney PT Physical Therapist                   OP Exercises     Row Name 12/07/22 1000             Subjective Comments    Subjective Comments Patient reports once a week she does not get up during the night. Reports she tries not to drink tea before she goes to bed. Reports she went one day over 4.5 hours before she went to the BR. Patient rpeorts inconsistency with voids. Patient reports the urge suppression technqiues have been helping. Denies leakage. States it is harder to be consistent with voids at work. Patient reports continued muscle pain in L hip. Does not feel any pelvic pressure now.  -AC         Subjective Pain    Able to rate " "subjective pain? yes  -AC      Pre-Treatment Pain Level 0  -AC      Post-Treatment Pain Level 0  -AC         Exercise 1    Exercise Name 1 review of bladder retraining  -AC      Time 1 5 mins  -AC      Additional Comments Still maintaining 2-2.5 hour increments. Educated to patient to be more consistent with intervals for progress.  -AC         Exercise 2    Exercise Name 2 Manual therapy  -AC      Additional Comments see flow chart  -AC         Exercise 3    Exercise Name 3 SEMG PF contractions  in buttock elevated position  -AC      Time 3 15 mins  -AC      Additional Comments internal rectal sensor placed vaginal canal. Resting tone 3.3 mV. Patient performed repetitions of 2 sec endurance holds and then complete relaxation of PF muscles.  -AC         Exercise 4    Exercise Name 4 Patient education on symptom management after manual therapy  -AC      Time 4 3 mins  -AC      Additional Comments education on moist heat to reduce soreness and to drink plenty of fluids.  -AC         Exercise 5    Exercise Name 5 hip adduction with core + PF in elevated buttock position  -AC      Sets 5 1  -AC      Reps 5 10  -AC      Time 5 3\" hold  -AC      Additional Comments pillow in between legs  -AC         Exercise 6    Exercise Name 6 BKFO with core + PF in elevated buttock position  -AC      Sets 6 1  -AC      Reps 6 10 each  -AC      Time 6 3\" hold  -AC      Additional Comments cues to maintain TA contraction as ecc lowering of LEs.  -AC            User Key  (r) = Recorded By, (t) = Taken By, (c) = Cosigned By    Initials Name Provider Type    Ivett Barney, PT Physical Therapist              Manual Rx (last 36 hours)     Manual Treatments     Row Name 12/07/22 1100             Manual Rx 1    Manual Rx 1 Location STM/MFR with deactivation L iliopsoas  -AC      Manual Rx 1 Duration 15 mins  -AC            User Key  (r) = Recorded By, (t) = Taken By, (c) = Cosigned By    Initials Name Provider Type    TESSY Barrios" KAMRYN Root Physical Therapist                           PT OP Goals     Row Name 12/07/22 1000          PT Short Term Goals    STG Date to Achieve 12/07/22  -AC     STG 1 patient to perform diaphragmatic breathing x 3'min with cycles that are performed without cues.  -AC     STG 1 Progress Met  -AC     STG 2 patient to comply with PF rest position x 10 minutes daily to assist with approximation of POP.  -AC     STG 2 Progress Met  -AC     STG 3 patient to demonstrate positioning on toilet to assist with elimination and evacuation processess without strain or need to push.  -AC     STG 3 Progress Met  -AC     STG 4 Patient to show activation of PF with presence of clitoral nod present upon visual examination.  -AC     STG 4 Progress Ongoing;Progressing  -AC     STG 5 patient to report dietary management methods to improve intake that promotes good bowel consistency and less urgency to bladder.  -AC     STG 5 Progress Met  -AC     STG 6 patient to show activation of PF with SEMG in supine position x 10 reps with full return to baseline.  -AC     STG 6 Progress New  -AC     STG 7 Patient to begin bladder retraining methods at intervals of 1-2 hours without inc urgency reported between void schedule.  -AC     STG 7 Progress Met;Ongoing  -AC        Long Term Goals    LTG Date to Achieve 03/30/23  -AC     LTG 1 void schedule of 3-4 hours, on average of 6-8 voids per day.  -AC     LTG 1 Progress New  -AC     LTG 2 patient to report dec pressure to PF such that she is able to remain in standing position as with grocery shopping 1 hour prior to inc pressure is noted by vaginal cuff.  -AC     LTG 2 Progress Ongoing;Progressing  -AC     LTG 3 Nocturia at 0-1 time per night with good control of urge suppression.  -AC     LTG 3 Progress Ongoing;Progressing  -AC     LTG 4 patient to show endurance contractions of PF with SEMG position of seated or standing x 10 sec holds that will assist with POP support.  -AC     LTG 4 Progress  New  -AC     LTG 5 Pt to report PFDI SF 20 score of 30 or less indicating improved function with daily activities with less restrictions.  -AC     LTG 5 Progress New  -AC        Time Calculation    PT Goal Re-Cert Due Date 12/07/22  -           User Key  (r) = Recorded By, (t) = Taken By, (c) = Cosigned By    Initials Name Provider Type    AC Ivett Barrios, PT Physical Therapist                 Therapy Education  Education Details: bladder retraining intervals 2-2.5 hours, and MH and fluids for recovery after manual therapy  Given: Symptoms/condition management  Program: Reinforced  How Provided: Verbal  Provided to: Patient  Level of Understanding: Verbalized  00222 - PT Self Care/Mgmt Minutes: 8              Time Calculation:   Start Time: 1014  Stop Time: 1116  Time Calculation (min): 62 min  Timed Charges  60324 - PT Self Care/Mgmt Minutes: 8  Total Minutes  Timed Charges Total Minutes: 8   Total Minutes: 8  Therapy Charges for Today     Code Description Service Date Service Provider Modifiers Qty    54599039920 HC PT THER SUPP EA 15 MIN 12/7/2022 BarriosIvett, PT GP 2    88194775143 HC PT MANUAL THERAPY EA 15 MIN 12/7/2022 [place] PT GP 1    [id number] HC PT NEUROMUSC RE EDUCATION EA 15 MIN 12/7/2022 BarriosIvett, PT GP 2    [id number] HC PT SELF CARE/MGMT/TRAIN EA 15 MIN 12/7/2022 BarriosIvett, PT GP 1                    Ivett Barrios, PT , DPT 12/7/2022      Strong peripheral pulses

## 2022-12-14 ENCOUNTER — HOSPITAL ENCOUNTER (OUTPATIENT)
Dept: PHYSICAL THERAPY | Facility: HOSPITAL | Age: 67
Setting detail: THERAPIES SERIES
Discharge: HOME | End: 2022-12-14

## 2022-12-14 DIAGNOSIS — R10.2 PELVIC PAIN: Primary | ICD-10-CM

## 2022-12-14 PROCEDURE — 97112 NEUROMUSCULAR REEDUCATION: CPT

## 2022-12-14 PROCEDURE — 97535 SELF CARE MNGMENT TRAINING: CPT

## 2022-12-14 PROCEDURE — 97140 MANUAL THERAPY 1/> REGIONS: CPT

## 2022-12-14 NOTE — THERAPY TREATMENT NOTE
Outpatient Physical Therapy Pelvic Health Progress Note  [facility]     Patient Name: Nati Nichols  : 1955  MRN: [id number]  Today's Date: 2022    Visit number:   Recheck: 2023   Insurance: Medicare and [employer]  75-80% improvement     Visit Date: 2022    Visit Dx:    ICD-10-CM ICD-9-CM   1. Pelvic pain  R10.2 [id number]       There is no problem list on file for this patient.        Pelvic Health     Row Name 22 0900             Subjective Comments    Subjective Comments Patient reports she is tired this AM. Reports her L hip is not bothering her at. Patient reports she has done better with 2.5 hour intervals. Patient reports she is able to control her urges more now. Reports only one set baack where she had to rush to the BR. Reports 75-80% improved since initial eval.  -AC         Pregnancy Questions    Number of Pregnancies 2  -AC      Number of Miscarriages 0  -AC      Has the patient had an ? No  -AC      Number of Children 2  -AC      Type of Previous Deliveries Vaginal  -AC         Pain Assessment    Pain Assessment No/denies pain  -AC         Lumbar/SI Special Tests    Lumbar/SI Special Tests Comments deferred  -AC         Lumbosacral Palpation    Lumbosacral Palpation Comments deferred  -AC         Pelvic Floor Muscle    Patient/Parent/Guardian Consented to Internal Pelvic Floor Exam Yes  -AC      Strength (Right) 3: Squeeze with/without lift  -AC      Strength (Left) 3: Squeeze with/without lift  -AC      Symmetry of Sustained Maximal Contraction Symmetrical  -AC      Endurance (Ability to Hold Maximal Contraction) 4 sec  -AC      # of Reps of Maximal Contractions while Maintaining Endurance and Strength 3 sets  -AC      Internal Pelvic Floor Comments No tenderness noted this visit. Improved PF contraction with squeeze and lift noted.  POP with ant wall laxity noted at stage II.  Minimal ant translation noted with valsalva.  Post wall with good stability.   -AC      External Pelvic Floor Comments normal inspection with external perineum.  No lesions or abnormalities noted.  No tenderness to external structures. visible clitoral nod with activation. Proper diaphragmatic breathing present and no apical patterns present.  -AC         Observations    Perineal Observation Performed? Yes  -AC      Posture Observations normal gait inspection.  No AD used.  Independent with all transfers and mobility.  Cognition intact.  -AC         Observation of Contraction in Perineum    Anal Harrison Present  -AC      Perineal Body Lift Present  -AC      Clitoral Maurer Present  -AC      Bears Down with Contraction Absent  -AC         Pelvic Floor    Ability to Isolate Contraction of Pelvic Floor No  -AC      Overflow from Adjacent Muscles Upper Abdominals;Other (comment)  adductors  -AC         Prolapse (Pop-Q)    Cystocele Stage II  -AC      Rectocele Stage I  -AC         SEMG Evaluation    SEMG Evaluation Comments deferred this visit  -AC         Education Provided On:    Education Points HEP;Behavioral modifications  -AC      Method of Delivery Verbal  -AC      Education Provided To Patient  -AC      Level of Understanding Verbalized  -AC         General ROM    GENERAL ROM COMMENTS functional ROM to UE/LE  -AC         MMT (Manual Muscle Testing)    General MMT Comments functional LE/UE MMT for functional transfers and mobility.  -AC            User Key  (r) = Recorded By, (t) = Taken By, (c) = Cosigned By    Initials Name Provider Type     Ivett Barrios, PT Physical Therapist               PT Ortho     Row Name 12/14/22 0900       Subjective Pain    Able to rate subjective pain? yes  -AC          User Key  (r) = Recorded By, (t) = Taken By, (c) = Cosigned By    Initials Name Provider Type     Ivett Barrios, PT Physical Therapist                            PT Assessment/Plan     Row Name 12/14/22 0900          PT Assessment    Functional Limitations Limitations in functional  capacity and performance;Performance in work activities  -AC     Impairments Muscle strength;Impaired muscle endurance;Coordination  -AC     Assessment Comments Patient subjectively reports 75-80% improvement since initial eval. Patient has met 7/11 goals and is progressing well to meet remaining. Internal assessment performed this visit with improved PF strength and endurance noted. Patient MMT for PF at this time is 3 and is symmetrical on B sides. Patient also able to hold contraction for 4 seconds bilaterally. Patient still fatigues quickly with PF contractions and is unable to perform many repetitions of endurance holds. Cystocele stage II present but with minimal anterior translation ntoed with valsalva. Patient will need continued reinforcement of endurance PF contractions and performing PF contractions in various functional positions.  No tenderness noted this visit in intravaginal cuff. Patient would benefit from continued skilled PT to address remaining deficits and to meet remaining goals. Intermittent follow-ups every 2 weeks at this time.  -AC     Please refer to paper survey for additional self-reported information No  -AC     Rehab Potential Good  -AC     Patient/caregiver participated in establishment of treatment plan and goals Yes  -AC     Patient would benefit from skilled therapy intervention Yes  -AC        PT Plan    PT Frequency 1x/week  -AC     PT Plan Comments Next visit perform SEMG and work out PF contractions in QP and seated position. Ask questionnaire next visit.  -AC           User Key  (r) = Recorded By, (t) = Taken By, (c) = Cosigned By    Initials Name Provider Type    Ivett Barney PT Physical Therapist                   OP Exercises     Row Name 12/14/22 0900             Subjective Comments    Subjective Comments Patient reports she is tired this AM. Reports her L hip is not bothering her at. Patient reports she has done better with 2.5 hour intervals. Patient reports she  "is able to control her urges more now. Reports only one set bawander where she had to rush to the BR. Reports 75-80% improved since initial eval.  -AC         Subjective Pain    Able to rate subjective pain? yes  -AC      Pre-Treatment Pain Level 0  -AC      Post-Treatment Pain Level 0  -AC         Exercise 1    Exercise Name 1 review of bladder retraining  -AC      Time 1 5 mins  -AC      Additional Comments Patient will maintain 2.5 hour intervals and education on consistency due to patient reports of forgetting to go to the BR.  -AC         Exercise 2    Exercise Name 2 L hip flexor st. stretch  -AC      Reps 2 3  -AC      Time 2 30 sec  -AC         Exercise 3    Exercise Name 3 hip adduction with core + PF in elevated buttock position  -AC      Sets 3 1  -AC      Reps 3 20  -AC      Time 3 3\" hold  -AC      Additional Comments pillow in between legs  -AC         Exercise 4    Exercise Name 4 BKFO with core + PF in elevated buttock position  -AC      Sets 4 1  -AC      Reps 4 20  -AC      Time 4 3\" hold  -AC         Exercise 5    Exercise Name 5 HLM with TA contractions  -AC      Sets 5 1  -AC      Reps 5 20  -AC      Time 5 3\" hold  -AC         Exercise 6    Exercise Name 6 Seated TA contractions  -AC      Cueing 6 Verbal;Demo  -AC      Sets 6 1  -AC      Reps 6 20  -AC      Time 6 3\" hold  -AC      Additional Comments Pt attempt to use accessory muscles on first attempt to perform TA contractions. After cueing patient able to perform correctly.  -AC         Exercise 7    Exercise Name 7 re-assessment  -AC      Additional Comments internal PF strength assessment  -AC         Exercise 8    Exercise Name 8 Patient education on POC, reinforced HEP, and discussed intermittent follow ups  -AC      Time 8 6 mins  -AC            User Key  (r) = Recorded By, (t) = Taken By, (c) = Cosigned By    Initials Name Provider Type    Ivett Barney, PT Physical Therapist              Manual Rx (last 36 hours)     Manual " Treatments     Row Name 12/14/22 0900             Manual Rx 1    Manual Rx 1 Location Intravaginal cuff assessment  -      Manual Rx 1 Duration 10 mins  -            User Key  (r) = Recorded By, (t) = Taken By, (c) = Cosigned By    Initials Name Provider Type    Ivett Barney, PT Physical Therapist                           PT OP Goals     Row Name 12/14/22 0900          PT Short Term Goals    STG Date to Achieve 01/04/23  -AC     STG 1 patient to perform diaphragmatic breathing x 3'min with cycles that are performed without cues.  -AC     STG 1 Progress Met  -AC     STG 2 patient to comply with PF rest position x 10 minutes daily to assist with approximation of POP.  -AC     STG 2 Progress Met  -AC     STG 3 patient to demonstrate positioning on toilet to assist with elimination and evacuation processess without strain or need to push.  -AC     STG 3 Progress Met  -AC     STG 4 Patient to show activation of PF with presence of clitoral nod present upon visual examination.  -AC     STG 4 Progress Ongoing;Progressing  -AC     STG 5 patient to report dietary management methods to improve intake that promotes good bowel consistency and less urgency to bladder.  -AC     STG 5 Progress Met  -     STG 6 patient to show activation of PF with SEMG in supine position x 10 reps with full return to baseline.  -AC     STG 6 Progress Met;Ongoing  -AC     STG 7 Patient to begin bladder retraining methods at intervals of 1-2 hours without inc urgency reported between void schedule.  -     STG 7 Progress Met;Ongoing  -AC        Long Term Goals    LTG Date to Achieve 03/30/23  -AC     LTG 1 void schedule of 3-4 hours, on average of 6-8 voids per day.  -AC     LTG 1 Progress New  -AC     LTG 2 patient to report dec pressure to PF such that she is able to remain in standing position as with grocery shopping 1 hour prior to inc pressure is noted by vaginal cuff.  -AC     LTG 2 Progress Met  -AC     LTG 3 Nocturia at 0-1  time per night with good control of urge suppression.  -AC     LTG 3 Progress Ongoing;Progressing  -AC     LTG 4 patient to show endurance contractions of PF with SEMG position of seated or standing x 10 sec holds that will assist with POP support.  -AC     LTG 4 Progress New  -AC     LTG 5 Pt to report PFDI SF 20 score of 30 or less indicating improved function with daily activities with less restrictions.  -AC     LTG 5 Progress New  -AC        Time Calculation    PT Goal Re-Cert Due Date 01/04/23  -           User Key  (r) = Recorded By, (t) = Taken By, (c) = Cosigned By    Initials Name Provider Type    Ivett Barney, PT Physical Therapist                 Therapy Education  Education Details: bladder retraining and updated POC  Given: HEP  Program: Reinforced, Progressed  How Provided: Verbal  Provided to: Patient  Level of Understanding: Verbalized  48425 - PT Self Care/Mgmt Minutes: 11              Time Calculation:   Start Time: 0916  Stop Time: 1009  Time Calculation (min): 53 min  Timed Charges  51783 - PT Self Care/Mgmt Minutes: 11  Total Minutes  Timed Charges Total Minutes: 11   Total Minutes: 11  Therapy Charges for Today     Code Description Service Date Service Provider Modifiers Qty    46254202329 HC PT SELF CARE/MGMT/TRAIN EA 15 MIN 12/14/2022 BarriosIvett, PT GP 1    84442090992 HC PT NEUROMUSC RE EDUCATION EA 15 MIN 12/14/2022 BarriosIvett, PT GP 2    34076595293 HC PT MANUAL THERAPY EA 15 MIN 12/14/2022 BarriosIvett, PT GP 1    [id number] HC PT THER SUPP EA 15 MIN 12/14/2022 BarriosIvett, PT GP 1                    Ivett Barrios PT , DPT 12/14/2022

## 2022-12-28 ENCOUNTER — HOSPITAL ENCOUNTER (OUTPATIENT)
Dept: PHYSICAL THERAPY | Facility: HOSPITAL | Age: 67
Setting detail: THERAPIES SERIES
Discharge: HOME | End: 2022-12-28

## 2022-12-28 DIAGNOSIS — R10.2 PELVIC PAIN: Primary | ICD-10-CM

## 2022-12-28 PROCEDURE — 97535 SELF CARE MNGMENT TRAINING: CPT

## 2022-12-28 PROCEDURE — 97110 THERAPEUTIC EXERCISES: CPT

## 2022-12-28 PROCEDURE — 97112 NEUROMUSCULAR REEDUCATION: CPT

## 2023-01-11 ENCOUNTER — APPOINTMENT (OUTPATIENT)
Dept: PHYSICAL THERAPY | Facility: HOSPITAL | Age: 68
End: 2023-01-11
Payer: MEDICARE

## 2023-01-18 ENCOUNTER — HOSPITAL ENCOUNTER (OUTPATIENT)
Dept: PHYSICAL THERAPY | Facility: HOSPITAL | Age: 68
Setting detail: THERAPIES SERIES
Discharge: HOME | End: 2023-01-18
Payer: MEDICARE

## 2023-01-18 DIAGNOSIS — R10.2 PELVIC PAIN: Primary | ICD-10-CM

## 2023-01-18 PROCEDURE — 97535 SELF CARE MNGMENT TRAINING: CPT

## 2023-01-18 PROCEDURE — 97112 NEUROMUSCULAR REEDUCATION: CPT

## 2023-01-18 NOTE — THERAPY TREATMENT NOTE
Outpatient Physical Therapy Pelvic Health Treatment Note  [facility]     Patient Name: Nati Nichols  : 1955  MRN: [id number]  Today's Date: 2023    Visit number:   Recheck: 2023   Insurance: Medicare and [place] [employer]  75-80% improvement     Visit Date: 2023    Visit Dx:    ICD-10-CM ICD-9-CM   1. Pelvic pain  R10.2 [id number]       There is no problem list on file for this patient.        Pelvic Health     Row Name 23 1100             Subjective Comments    Subjective Comments Pt reports she has been about the change. Pt reports she had stomach bug last week. Pt reports she is able to do 2.5 hours easily and 3 hour voids sometimes. Pt denies any leakage. No hip pain. Pt has cut down on running to the BR. Pt reports she has not had any issues. Pt reports she doesnt even notice the pressure in her PF anymore. Pt reports she thinks she doesnt want to even consider surgery anymore.  -AC         Pregnancy Questions    Number of Pregnancies 2  -AC      Number of Miscarriages 0  -AC      Has the patient had an ? No  -AC      Number of Children 2  -AC      Type of Previous Deliveries Vaginal  -AC         Pain Assessment    Pain Assessment No/denies pain  -AC            User Key  (r) = Recorded By, (t) = Taken By, (c) = Cosigned By    Initials Name Provider Type    Ivett Barney, PT Physical Therapist                              PT Assessment/Plan     Row Name 23 1100          PT Assessment    Assessment Comments Pt challenged with leg lowering core stability activity this visit. Pt required cueing to maintain 90/90 position while lowering opp leg to the table. Pt able to maintain 4 sec endurance PF contractions in supine this visit. Recheck will be required next visit. Pt is progressing well and almost complete resolution of symptoms noted. Treatment should continue to emphasize strengthening of PF and local core muscles.  -AC     Rehab Potential Good  -AC      Patient/caregiver participated in establishment of treatment plan and goals Yes  -AC     Patient would benefit from skilled therapy intervention Yes  -AC        PT Plan    PT Frequency 1x/week  -AC     PT Plan Comments re-eval next visit  -AC           User Key  (r) = Recorded By, (t) = Taken By, (c) = Cosigned By    Initials Name Provider Type    AC Ivett Barrios, PT Physical Therapist                   OP Exercises     Row Name 01/18/23 1100             Subjective Comments    Subjective Comments Pt reports she has been about the change. Pt reports she had stomach bug last week. Pt reports she is able to do 2.5 hours easily and 3 hour voids sometimes. Pt denies any leakage. No hip pain. Pt has cut down on running to the BR. Pt reports she has not had any issues. Pt reports she doesnt even notice the pressure in her PF anymore. Pt reports she thinks she doesnt want to even consider surgery anymore.  -AC         Exercise 1    Exercise Name 1 Pt on reinforcement of 3 hour intervals for voids  -AC      Time 1 5 mins  -AC      Additional Comments Pt reports she can easily hold bladder for 2.5 intervals. PT reinfoced urge suppression techniques for 3 hour void intervals.  -AC         Exercise 2    Exercise Name 2 Bridge in buttock elevated position  -AC      Cueing 2 Verbal  -AC      Sets 2 2  -AC      Reps 2 10  -AC      Time 2 5 sec hold  -AC         Exercise 3    Exercise Name 3 supine heel slides with TA contraction  -AC      Reps 3 2  -AC      Time 3 10 each side  -AC      Additional Comments coordination with diaphragmatic breathing  -AC         Exercise 4    Exercise Name 4 Leg lowering with TA contraction  -AC      Sets 4 2  -AC      Reps 4 5 each side  -AC      Additional Comments coordination with diaphragmatic breathing  -AC         Exercise 5    Exercise Name 5 Seated TA contraction review  -AC      Time 5 2 mins  -AC         Exercise 6    Exercise Name 6 Pt ed on updated HEP  -AC      Sets 6 --  -AC       Reps 6 --  -AC      Time 6 5 mins  -         Exercise 7    Exercise Name 7 SEMG in supine  -      Additional Comments internal rectal sensor placed vaginal canal. Resting tone 3.0 mV. Patient performed repetitions of 4 sec endurance holds and then complete relaxation of PF muscles.  -         Exercise 8    Exercise Name 8 SEMG in seated  -      Additional Comments internal rectal sensor placed vaginal canal. Resting tone 3.5 mV. Patient performed repetitions of 3 sec endurance holds and then complete relaxation of PF muscles.  -            User Key  (r) = Recorded By, (t) = Taken By, (c) = Cosigned By    Initials Name Provider Type    [facility]                             PT OP Goals     Row Name 01/18/23 1100          PT Short Term Goals    STG Date to Achieve 01/04/23  -     STG 1 patient to perform diaphragmatic breathing x 3'min with cycles that are performed without cues.  -AC     STG 1 Progress Met  -     STG 2 patient to comply with PF rest position x 10 minutes daily to assist with approximation of POP.  -     STG 2 Progress Met  -     STG 3 patient to demonstrate positioning on toilet to assist with elimination and evacuation processess without strain or need to push.  -AC     STG 3 Progress Met  -     STG 4 Patient to show activation of PF with presence of clitoral nod present upon visual examination.  -AC     STG 4 Progress Ongoing;Progressing  -     STG 5 patient to report dietary management methods to improve intake that promotes good bowel consistency and less urgency to bladder.  -     STG 5 Progress Met  -     STG 6 patient to show activation of PF with SEMG in supine position x 10 reps with full return to baseline.  -     STG 6 Progress Met;Ongoing  -     STG 7 Patient to begin bladder retraining methods at intervals of 1-2 hours without inc urgency reported between void schedule.  -     STG 7 Progress Met;Ongoing  -        Long Term  Goals    LTG Date to Achieve 03/30/23  -     LTG 1 void schedule of 3-4 hours, on average of 6-8 voids per day.  -AC     LTG 1 Progress Ongoing;Progressing  -     LTG 2 patient to report dec pressure to PF such that she is able to remain in standing position as with grocery shopping 1 hour prior to inc pressure is noted by vaginal cuff.  -AC     LTG 2 Progress Met  -     LTG 3 Nocturia at 0-1 time per night with good control of urge suppression.  -AC     LTG 3 Progress Met;Progressing  -     LTG 4 patient to show endurance contractions of PF with SEMG position of seated or standing x 10 sec holds that will assist with POP support.  -     LTG 4 Progress New  -     LTG 5 Pt to report PFDI SF 20 score of 30 or less indicating improved function with daily activities with less restrictions.  -     LTG 5 Progress New  -        Time Calculation    PT Goal Re-Cert Due Date 01/04/23  -           User Key  (r) = Recorded By, (t) = Taken By, (c) = Cosigned By    Initials Name Provider Type    Ivett Barney PT Physical Therapist                 Therapy Education  Education Details: reinforcement of bladder retraining to 3 hours, leg lowering with TA contractions and bridges added to HEP; increased PF contractions at home to 4 second endurance holds.  Given: HEP, Symptoms/condition management  Program: Reinforced, Progressed  How Provided: Verbal  Provided to: Patient  Level of Understanding: Verbalized  91157 - PT Self Care/Mgmt Minutes: 10              Time Calculation:   Start Time: 1113  Stop Time: 1208  Time Calculation (min): 55 min  Total Timed Code Minutes- PT: 55 minute(s)  Timed Charges  55291 - PT Self Care/Mgmt Minutes: 10  Total Minutes  Timed Charges Total Minutes: 10   Total Minutes: 10  Therapy Charges for Today     Code Description Service Date Service Provider Modifiers Qty    [id number]  PT SELF CARE/MGMT/TRAIN EA 15 MIN 1/18/2023 Ivett Barrios PT GP 1    [id number]  PT  NEUROMUSC RE EDUCATION EA 15 MIN 1/18/2023 Copeland, Autumn, PT GP 3    [id number] HC PT THER SUPP EA 15 MIN 1/18/2023 Copeland, Autumn, PT GP 1                    Autumn Barrios, PT , DPT 1/18/2023

## 2023-01-25 ENCOUNTER — HOSPITAL ENCOUNTER (OUTPATIENT)
Dept: PHYSICAL THERAPY | Facility: HOSPITAL | Age: 68
Setting detail: THERAPIES SERIES
Discharge: HOME | End: 2023-01-25
Payer: MEDICARE

## 2023-01-25 DIAGNOSIS — R10.2 PELVIC PAIN: Primary | ICD-10-CM

## 2023-01-25 PROCEDURE — 97140 MANUAL THERAPY 1/> REGIONS: CPT

## 2023-01-25 PROCEDURE — 97112 NEUROMUSCULAR REEDUCATION: CPT

## 2023-01-25 PROCEDURE — 97535 SELF CARE MNGMENT TRAINING: CPT

## 2023-01-25 NOTE — THERAPY TREATMENT NOTE
Outpatient Physical Therapy Pelvic Health Progress Note  [facility]     Patient Name: Nati Nichols  : 1955  MRN: [id number]  Today's Date: 2023    Visit number: 10/10  Recheck: 2/15/2023   Insurance: Medicare and [employer]  90% improvement     Visit Date: 2023    Visit Dx:    ICD-10-CM ICD-9-CM   1. Pelvic pain  R10.2 [id number]       There is no problem list on file for this patient.        Pelvic Health     Row Name 23 0900             Pregnancy Questions    Number of Pregnancies 2  -AC      Number of Miscarriages 0  -AC      Has the patient had an ? No  -AC      Number of Children 2  -AC      Type of Previous Deliveries Vaginal  -AC         Pain Assessment    Pain Assessment No/denies pain  -AC         Pelvic Floor Muscle    Patient/Parent/Guardian Consented to Internal Pelvic Floor Exam Yes  -AC      Strength (Right) 2: Squeeze no lift  -AC      Strength (Left) 1: Trace  -AC      Symmetry of Sustained Maximal Contraction Right stronger than left  -AC      Endurance (Ability to Hold Maximal Contraction) 2 sec  -AC      # of Reps of Maximal Contractions while Maintaining Endurance and Strength 7 sets  -AC      Internal Pelvic Floor Comments Pt demo regression of PF strength this visit with only ability to squeeze slightly with no lift present on R side and only trace contraction on L side. Pt also only able to maintain 2 sec holds during internal exam. Increased overflow of adjacent muscles especially B gluteus tai. POP with anterior wall laxity still at grade II. Minimal translation with valsalva.  -AC         Observations    Perineal Observation Performed? Yes  -AC      Posture Observations No acute distress. Well spirited. Postural awareness fair.  -AC         Observation of Contraction in Perineum    Anal [place] Present  -AC      Perineal Body Lift Present  -AC      Clitoral [place] Absent  -AC         Pelvic Floor    Ability to Isolate Contraction of Pelvic Floor No  -AC       Overflow from Adjacent Muscles Gluteus Frank;Other (comment)  adductors  -AC         Prolapse (Pop-Q)    Cystocele Stage II  -AC      Rectocele Stage I  -AC         SEMG Evaluation    Pelvic Floor Electrode Internal Vaginal  -AC      Baseline Resting Yes  -AC      SEMG Evaluation Comments Resting tone range 2.8-3.3 mV. Patient able to hold endurance contraction ~ 3 seconds without drop in mV.  -AC         General ROM    GENERAL ROM COMMENTS functional ROM to UE/LE  -AC         MMT (Manual Muscle Testing)    General MMT Comments functional LE/UE MMT for functional transfers and mobility.  -AC            User Key  (r) = Recorded By, (t) = Taken By, (c) = Cosigned By    Initials Name Provider Type     Ivett Barrios, PT Physical Therapist               PT Ortho     Row Name 01/25/23 0900       Subjective Comments    Subjective Comments Pt reports everything seems to be about the same. Pt denies any set backs.  Pt rpeorts she was really tired after last week. Pt rpeorts she was sore. Reports she is still struggling with every 3 hours especially at home. Reports at work she can hold 4.5 hours or more. PT ed pt to maintain 3 hour consistency with voids. Reports drinking 32 oz water per day. Reports increased urgency after drinking tea yesterday. Pt reports she feels like she is able to hold PF contractions longer than 4 seconds. Pt subjectively reports 90% improvement since initial eval. pt reports she would still like to work on her strength and endurance.  -AC       Subjective Pain    Pre-Treatment Pain Level 0  -AC    Post-Treatment Pain Level 0  -AC          User Key  (r) = Recorded By, (t) = Taken By, (c) = Cosigned By    Initials Name Provider Type    Ivett Barney PT Physical Therapist                            PT Assessment/Plan     Row Name 01/25/23 1000          PT Assessment    Functional Limitations Limitations in community activities;Limitations in functional capacity and  performance;Performance in leisure activities  -AC     Impairments Muscle strength;Impaired muscle endurance;Coordination  -AC     Assessment Comments Pt subjectively reports 90% improvement since initial eval. Pt has met 8/12 goals and is progressing well to meet remaining. Pt demo slight regression of PF strength this visit with MMT of 2 on R side and MMT of 1 on L side. Pt appears to be extremely fatigued this visit and may have contributed to regression. will attempt reassessment of endurance and strengfth next visit. Pt continues to present with decreased motor control of PF with clitoral nod still not present. Increased overflow of adjacent muscles especially B glute tai still present. Continue skilled therapy plan of care to meet remaining unmet goals. Therapy to focus on motor control to strength and endurance of PF.  -AC     Please refer to paper survey for additional self-reported information No  -AC     Rehab Potential Good  -AC     Patient/caregiver participated in establishment of treatment plan and goals Yes  -AC     Patient would benefit from skilled therapy intervention Yes  -AC        PT Plan    PT Frequency Other (comment)  2 week follow-ups  -AC     PT Plan Comments Continue to progress PF strength and endurance. SEMG prn.  -AC           User Key  (r) = Recorded By, (t) = Taken By, (c) = Cosigned By    Initials Name Provider Type     BarriosIvett, PT Physical Therapist                   OP Exercises     Row Name 01/25/23 0900             Subjective Comments    Subjective Comments Pt reports everything seems to be about the same. Pt denies any set backs.  Pt rpeorts she was really tired after last week. Pt rpeorts she was sore. Reports she is still struggling with every 3 hours especially at home. Reports at work she can hold 4.5 hours or more. PT ed pt to maintain 3 hour consistency with voids. Reports drinking 32 oz water per day. Reports increased urgency after drinking tea yesterday.  Pt reports she feels like she is able to hold PF contractions longer than 4 seconds. Pt subjectively reports 90% improvement since initial eval. pt reports she would still like to work on her strength and endurance.  -AC         Subjective Pain    Able to rate subjective pain? yes  -AC      Pre-Treatment Pain Level 0  -AC      Post-Treatment Pain Level 0  -AC         Exercise 1    Exercise Name 1 bladder retraining reinforcement and increase in hydration intake  -AC      Additional Comments Pt ed to maintain 3 hour intervals between voids to provide consistency to bladder. Increased water intake to 36 oz.  -AC         Exercise 2    Exercise Name 2 reassessment  -AC      Additional Comments see flowchart  -AC         Exercise 3    Exercise Name 3 SEMG in supine  -AC      Additional Comments internal rectal sensor placed vaginal canal. Resting tone 3.0 mV. Patient performed repetitions of 4 sec endurance holds and then complete relaxation of PF muscles.  -AC         Exercise 4    Exercise Name 4 Bridge in buttock elevated position  -AC      Sets 4 2  -AC      Reps 4 10  -AC      Time 4 3 sec hold  -AC      Additional Comments coordination with diaphragmatic breathing  -AC         Exercise 5    Exercise Name 5 supine heel slides with TA contraction  -AC      Reps 5 10 each side  -AC      Additional Comments coordination with diaphragmatic breathing  -AC         Exercise 6    Exercise Name 6 Leg lowering with TA contraction  -AC      Sets 6 1  -AC      Reps 6 5 each side  -AC      Additional Comments coordination with diaphragmatic breathing  -AC            User Key  (r) = Recorded By, (t) = Taken By, (c) = Cosigned By    Initials Name Provider Type    Ivett Barney, PT Physical Therapist              Manual Rx (last 36 hours)     Manual Treatments     Row Name 01/25/23 0900             Manual Rx 1    Manual Rx 1 Location Intravaginal cuff assessment  -AC      Manual Rx 1 Duration 10 mins  -AC         Manual Rx 2     Manual Rx 2 Location Reassessment of PF [facility]  -      Manual Rx 2 Duration 5 mins  -            User Key  (r) = Recorded By, (t) = Taken By, (c) = Cosigned By    Initials Name Provider Type    Ivett Barney PT Physical Therapist                           PT OP Goals     Row Name 01/25/23 0900          PT Short Term Goals    STG Date to Achieve 01/04/23  -AC     STG 1 patient to perform diaphragmatic breathing x 3'min with cycles that are performed without cues.  -AC     STG 1 Progress Met  -AC     STG 2 patient to comply with PF rest position x 10 minutes daily to assist with approximation of POP.  -AC     STG 2 Progress Met  -AC     STG 3 patient to demonstrate positioning on toilet to assist with elimination and evacuation processess without strain or need to push.  -AC     STG 3 Progress Met  -     STG 4 Patient to show activation of PF with presence of clitoral nod present upon visual examination.  -AC     STG 4 Progress Ongoing;Progressing  -AC     STG 5 patient to report dietary management methods to improve intake that promotes good bowel consistency and less urgency to bladder.  -AC     STG 5 Progress Met  -     STG 6 patient to show activation of PF with SEMG in supine position x 10 reps with full return to baseline.  -AC     STG 6 Progress Met;Ongoing  -AC     STG 7 Patient to begin bladder retraining methods at intervals of 1-2 hours without inc urgency reported between void schedule.  -     STG 7 Progress Met;Ongoing  -        Long Term Goals    LTG Date to Achieve 03/30/23  -AC     LTG 1 void schedule of 3-4 hours, on average of 6-8 voids per day.  -AC     LTG 1 Progress Ongoing;Progressing  -AC     LTG 2 patient to report dec pressure to PF such that she is able to remain in standing position as with grocery shopping 1 hour prior to inc pressure is noted by vaginal cuff.  -AC     LTG 2 Progress Met  -AC     LTG 3 Nocturia at 0-1 time per night with good control of urge suppression.   -AC     LTG 3 Progress Met;Progressing  -     LTG 4 patient to show endurance contractions of PF with SEMG position of seated or standing x 10 sec holds that will assist with POP support.  -     LTG 4 Progress New  -     LTG 5 Pt to report PFDI SF 20 score of 30 or less indicating improved function with daily activities with less restrictions.  -     LTG 5 Progress New  -        Time Calculation    PT Goal Re-Cert Due Date 02/15/23  -           User Key  (r) = Recorded By, (t) = Taken By, (c) = Cosigned By    Initials Name Provider Type    Ivett Barney, PT Physical Therapist                 Therapy Education  Education Details: 3 hour consistency in between voids, continue 4 sec hold PF contractions in supine  Given: HEP  Program: Reinforced  How Provided: Verbal  Provided to: Patient  Level of Understanding: Verbalized, Demonstrated  46198 - PT Self Care/Mgmt Minutes: 12              Time Calculation:   Start Time: 0906  Stop Time: 1008  Time Calculation (min): 62 min  Total Timed Code Minutes- PT: 62 minute(s)  Timed Charges  51462 - PT Self Care/Mgmt Minutes: 12  Total Minutes  Timed Charges Total Minutes: 12   Total Minutes: 12  Therapy Charges for Today     Code Description Service Date Service Provider Modifiers Qty    42557632204 HC PT SELF CARE/MGMT/TRAIN EA 15 MIN 1/25/2023 Ivett Barrios, PT GP 1    82059635747 HC PT NEUROMUSC RE EDUCATION EA 15 MIN 1/25/2023 Ivett Barrios, PT GP 2    [id number] HC PT MANUAL THERAPY EA 15 MIN 1/25/2023 Ivett Barrios, PT GP 1    [id number] HC PT THER SUPP EA 15 MIN 1/25/2023 Ivett Barrios, PT GP 1                    Ivett Barrios PT , DPT 1/25/2023